# Patient Record
Sex: FEMALE
[De-identification: names, ages, dates, MRNs, and addresses within clinical notes are randomized per-mention and may not be internally consistent; named-entity substitution may affect disease eponyms.]

---
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
## 2025-01-06 ENCOUNTER — NURSE TRIAGE (OUTPATIENT)
Dept: NURSING | Facility: CLINIC | Age: 20
End: 2025-01-06

## 2025-01-06 NOTE — TELEPHONE ENCOUNTER
Triage call  Mother calling to report the patient has been vomiting  and  diarrhea since about 10 pm last night she has tried to give her zofran but she continues to vomit and have diarrhea. She was vomiting about every 15 minutes and she has been on the toilet most of the night with diarrhea.   She is also having abdominal pain she rates a 7-8/10 and it is constant. Patient gave verbal permission to talk with her mother.    Per protocol  go to the ED now ( or PCP triage).  Care advice given.  Verbalizes understanding and agrees with plan.    Stephania Cardenas RN   [facility] Nurse Advisor  5:45 AM 2025    Reason for Disposition   [1] SEVERE constant abdominal pain (when not vomiting) AND [2] present > 1 hour    Additional Information   Negative: Shock suspected (very weak, limp, not moving, too weak to stand, pale cool skin)   Negative: Sounds like a life-threatening emergency to the triager   Negative: Vomiting occurs without diarrhea (multiple watery or very loose stools)   Negative: Diarrhea is the main symptom (vomiting is resolved)   Negative: [1] Vomiting and/or diarrhea is present AND [2] age > 1 year AND [3] ate spoiled food in previous 12 hours   Negative: [1] Diarrhea present AND [2] sounds like infant spitting up (reflux)   Negative: Severe dehydration suspected (very dizzy when tries to stand or has fainted)   Negative: [1] Blood (red or coffee grounds color) in the vomit AND [2] not from a nosebleed  (Exception: Few streaks AND only occurs once AND age > 1 year)   Negative: Difficult to awaken   Negative: Confused talking or behavior   Negative: Poisoning suspected (with a medicine, plant or chemical)   Negative: [1] Age < 12 weeks AND [2] fever 100.4 F (38.0 C) or higher by any route (Note: Preference is to confirm with rectal temperature)   Negative: [1]  (< 1 month old) AND [2] starts to look or act abnormal in any way (e.g., decrease in activity or feeding)   Negative: [1]   (< 1 month old) AND [2] vomited 2 or more times in last 24 hours (Exception: normal reflux or spitting up)   Negative: [1] Age < 12 weeks AND [2] not acting normal (ill-appearing) when not vomiting AND [3] vomited 3 or more times in last 24 hours (Exception: normal reflux or spitting up)   Negative: [1] Bile (green color) in the vomit AND [2] 2 or more times (Exception: Stomach juice which is yellow)   Negative: Appendicitis suspected (e.g., constant pain > 2 hours, RLQ location, walks bent over holding abdomen, jumping makes pain worse, etc)    Protocols used: Vomiting With Diarrhea-P-AH